# Patient Record
Sex: MALE | Race: WHITE | NOT HISPANIC OR LATINO | Employment: FULL TIME | URBAN - METROPOLITAN AREA
[De-identification: names, ages, dates, MRNs, and addresses within clinical notes are randomized per-mention and may not be internally consistent; named-entity substitution may affect disease eponyms.]

---

## 2018-02-17 ENCOUNTER — HOSPITAL ENCOUNTER (EMERGENCY)
Facility: HOSPITAL | Age: 53
Discharge: HOME | End: 2018-02-17
Attending: EMERGENCY MEDICINE | Admitting: EMERGENCY MEDICINE
Payer: COMMERCIAL

## 2018-02-17 VITALS
TEMPERATURE: 98 F | BODY MASS INDEX: 29.8 KG/M2 | DIASTOLIC BLOOD PRESSURE: 85 MMHG | SYSTOLIC BLOOD PRESSURE: 144 MMHG | OXYGEN SATURATION: 97 % | HEART RATE: 69 BPM | WEIGHT: 220 LBS | RESPIRATION RATE: 18 BRPM | HEIGHT: 72 IN

## 2018-02-17 DIAGNOSIS — S61.011A LACERATION OF RIGHT THUMB: Primary | ICD-10-CM

## 2018-02-17 PROCEDURE — 99282 EMERGENCY DEPT VISIT SF MDM: CPT

## 2018-02-17 RX ADMIN — Medication 1 APPLICATION: at 18:14

## 2018-02-17 NOTE — DISCHARGE INSTRUCTIONS
Finger Laceration   WHAT YOU NEED TO KNOW:   A finger laceration is a deep cut in your skin  It is often caused by a sharp object, such as a knife, or blunt force to your finger  Your blood vessels, bones, joints, tendons, or nerves may also be injured  DISCHARGE INSTRUCTIONS:   Return to the emergency department if:   · Your wound comes apart  · Blood soaks through your bandage  · You have severe pain in your finger or hand  · Your finger is pale and cold  · You have sudden trouble moving your finger  · Your swelling suddenly gets worse  · You have red streaks on your skin coming from your wound  Contact your healthcare provider or hand specialist if:   · You have new numbness or tingling  · Your finger feels warm, looks swollen or red, and is draining pus  · You have a fever  · You have questions or concerns about your condition or care  Medicines: You may  need any of the following:  · Antibiotics  help prevent a bacterial infection  · Acetaminophen  decreases pain and fever  It is available without a doctor's order  Ask how much to take and how often to take it  Follow directions  Read the labels of all other medicines you are using to see if they also contain acetaminophen, or ask your doctor or pharmacist  Acetaminophen can cause liver damage if not taken correctly  Do not use more than 4 grams (4,000 milligrams) total of acetaminophen in one day  · Prescription pain medicine  may be given  Ask your healthcare provider how to take this medicine safely  Some prescription pain medicines contain acetaminophen  Do not take other medicines that contain acetaminophen without talking to your healthcare provider  Too much acetaminophen may cause liver damage  Prescription pain medicine may cause constipation  Ask your healthcare provider how to prevent or treat constipation  · Take your medicine as directed    Contact your healthcare provider if you think your medicine is not helping or if you have side effects  Tell him or her if you are allergic to any medicine  Keep a list of the medicines, vitamins, and herbs you take  Include the amounts, and when and why you take them  Bring the list or the pill bottles to follow-up visits  Carry your medicine list with you in case of an emergency  Self-care:   · Apply ice  on your finger for 15 to 20 minutes every hour or as directed  Use an ice pack, or put crushed ice in a plastic bag  Cover it with a towel before you apply it to your skin  Ice helps prevent tissue damage and decreases swelling and pain  · Elevate  your hand above the level of your heart as often as you can  This will help decrease swelling and pain  Prop your hand on pillows or blankets to keep it elevated comfortably  · Wear your splint as directed  A splint will decrease movement and stress on your wound  The splint may help your wound heal faster  Ask your healthcare provider how to apply and remove a splint  · Apply ointments to decrease scarring  Do not apply ointments until your healthcare provider says it is okay  You may need to wait until your wound is healed  Ask which ointment to buy and how often to use it  Wound care:   · Do not get your wound wet until your healthcare provider says it is okay  Do not soak your hand in water  Do not go swimming until your healthcare provider says it is okay  When your healthcare provider says it is okay, carefully wash around the wound with soap and water  Let soap and water run over your wound  Gently pat the area dry or allow it to air dry  · Change your bandages when they get wet, dirty, or after washing  Apply new, clean bandages as directed  Do not apply elastic bandages or tape too tightly  Do not put powders or lotions on your wound  · Apply antibiotic ointment as directed  Your healthcare provider may give you antibiotic ointment to put over your wound if you have stitches   If you have Strips-Strips over your wound, let them dry up and fall off on their own  If they do not fall off within 14 days, gently remove them  If you have glue over your wound, do not remove or pick at it  If your glue comes off, do not replace it with glue that you have at home  · Check your wound every day for signs of infection  Signs of infection include swelling, redness, or pus  Follow up with your healthcare provider or hand specialist in 2 days:  Write down your questions so you remember to ask them during your visits  © 2017 [employer]  Information is for End User's use only and may not be sold, redistributed or otherwise used for commercial purposes  All illustrations and images included in CareNotes® are the copyrighted property of [employer]  or Rickey Joseph  The above information is an  only  It is not intended as medical advice for individual conditions or treatments  Talk to your doctor, nurse or pharmacist before following any medical regimen to see if it is safe and effective for you

## 2018-03-07 NOTE — ED PROVIDER NOTES
History  Chief Complaint   Patient presents with    Finger Laceration     Pt sliced first digit of right hand (thumb) with mandolin  Pt last had tetanus shot 1 year ago  History provided by:  Patient   used: No    Finger Laceration   Location:  Hand  Hand laceration location:  R fingers  Length:  1 cm  Depth:  Cutaneous  Quality: avulsion    Bleeding: venous    Laceration mechanism:  Unable to specify  Pain details:     Quality:  Aching    Severity:  No pain    Timing:  Constant    Progression:  Unchanged  Foreign body present:  No foreign bodies  Relieved by:  Pressure  Worsened by:  Nothing  Ineffective treatments:  Pressure  Tetanus status:  Up to date  Associated symptoms: no fever, no numbness, no rash, no swelling and no streaking        None       Past Medical History:   Diagnosis Date    GERD (gastroesophageal reflux disease)        History reviewed  No pertinent surgical history  History reviewed  No pertinent family history  I have reviewed and agree with the history as documented  Social History   Substance Use Topics    Smoking status: Never Smoker    Smokeless tobacco: Not on file    Alcohol use No        Review of Systems   Constitutional: Negative for activity change, diaphoresis, fatigue and fever  HENT: Negative for sore throat  Eyes: Negative for visual disturbance  Respiratory: Negative for chest tightness and shortness of breath  Cardiovascular: Negative for chest pain  Gastrointestinal: Negative for abdominal pain, diarrhea, nausea and vomiting  Genitourinary: Negative for dysuria, frequency, hematuria and urgency  Musculoskeletal: Negative for back pain, gait problem, neck pain and neck stiffness  Skin: Positive for wound  Negative for pallor and rash  Allergic/Immunologic: Negative for immunocompromised state  Neurological: Negative for dizziness, speech difficulty, numbness and headaches     Hematological: Does not bruise/bleed easily  Psychiatric/Behavioral: Negative for confusion  Physical Exam  ED Triage Vitals   Temperature Pulse Respirations Blood Pressure SpO2   02/17/18 1729 02/17/18 1730 02/17/18 1730 02/17/18 1730 02/17/18 1730   98 °F (36 7 °C) 69 18 144/85 97 %      Temp Source Heart Rate Source Patient Position - Orthostatic VS BP Location FiO2 (%)   02/17/18 1729 02/17/18 1730 02/17/18 1730 02/17/18 1730 --   Oral Monitor Sitting Right arm       Pain Score       02/17/18 1730       No Pain           Orthostatic Vital Signs  Vitals:    02/17/18 1730   BP: 144/85   Pulse: 69   Patient Position - Orthostatic VS: Sitting       Physical Exam   Constitutional: He is oriented to person, place, and time  He appears well-developed and well-nourished  HENT:   Head: Normocephalic  Eyes: Conjunctivae and EOM are normal  Pupils are equal, round, and reactive to light  No scleral icterus  Neck: Normal range of motion  Neck supple  No JVD present  Cardiovascular: Normal rate and regular rhythm  Pulmonary/Chest: Effort normal and breath sounds normal  No respiratory distress  Abdominal: Soft  Bowel sounds are normal  He exhibits no distension  There is no tenderness  There is no rebound and no guarding  Musculoskeletal: Normal range of motion  He exhibits no tenderness or deformity  Lymphadenopathy:     He has no cervical adenopathy  Neurological: He is alert and oriented to person, place, and time  Coordination normal    Skin: Skin is warm and dry  Capillary refill takes less than 2 seconds  He is not diaphoretic  1x0 5 cm avulsion injury to right thumb pad, mild venous oozing  No nailbed involvement  Psychiatric: He has a normal mood and affect  Vitals reviewed        ED Medications  Medications   LET gel 1 application (1 application Topical Given 2/17/18 1814)       Diagnostic Studies  Results Reviewed     None                 No orders to display              Procedures  Procedures       Phone Contacts  ED Phone Contact    ED Course  ED Course                                MDM  Number of Diagnoses or Management Options  Laceration of right thumb: new and requires workup  Diagnosis management comments: 57-year-old male presented for evaluation of a skin avulsion injury to his right thumb pad  There is no nail bed involvement  Unfortunately, this injury is not amenable to suturing or repair  Patient presented to the emergency department because he was having difficulty stopping the wound from bleeding  Applied let gel in the emergency department and applied some surgeon home  Bleeding stopped  Patient has normal capillary refill and sensation  Will be discharged to follow up with PCP as necessary  Tetanus immunization was up-to-date  Amount and/or Complexity of Data Reviewed  Review and summarize past medical records: yes      CritCare Time    Disposition  Final diagnoses:   Laceration of right thumb     Time reflects when diagnosis was documented in both MDM as applicable and the Disposition within this note     Time User Action Codes Description Comment    2/17/2018  6:58 PM Danya Howard Add [T00 119Y] Laceration of left thumb     2/17/2018  6:59 PM Danya Howard Remove [L78 517V] Laceration of left thumb     2/17/2018  6:59 PM Danya Howard Add [S61 011A] Laceration of right thumb       ED Disposition     ED Disposition Condition Comment    Discharge  Burton Mcclellan discharge to home/self care  Condition at discharge: Good        Follow-up Information     Follow up With Specialties Details Why Contact Info    PCP   as needed         There are no discharge medications for this patient  No discharge procedures on file      ED Provider  Electronically Signed by           Dayne Koenig MD  03/07/18 5390

## 2024-09-26 ENCOUNTER — OFFICE VISIT (OUTPATIENT)
Dept: URGENT CARE | Facility: CLINIC | Age: 59
End: 2024-09-26
Payer: COMMERCIAL

## 2024-09-26 VITALS
RESPIRATION RATE: 14 BRPM | HEART RATE: 63 BPM | OXYGEN SATURATION: 97 % | TEMPERATURE: 97.1 F | DIASTOLIC BLOOD PRESSURE: 86 MMHG | SYSTOLIC BLOOD PRESSURE: 135 MMHG

## 2024-09-26 DIAGNOSIS — R42 DIZZINESS AND GIDDINESS: Primary | ICD-10-CM

## 2024-09-26 PROCEDURE — G0382 LEV 3 HOSP TYPE B ED VISIT: HCPCS | Performed by: PHYSICIAN ASSISTANT

## 2024-09-26 RX ORDER — TADALAFIL 5 MG/1
TABLET ORAL
COMMUNITY
Start: 2024-04-20

## 2024-09-26 RX ORDER — FINASTERIDE 5 MG/1
TABLET, FILM COATED ORAL
COMMUNITY
Start: 2024-04-04

## 2024-09-26 RX ORDER — OMEPRAZOLE 40 MG/1
CAPSULE, DELAYED RELEASE ORAL
COMMUNITY
Start: 2024-04-16

## 2024-09-26 RX ORDER — PLECANATIDE 3 MG/1
TABLET ORAL
COMMUNITY
Start: 2024-04-20

## 2024-09-26 NOTE — PATIENT INSTRUCTIONS
Patient Education    POCT Glucose 106  ECG: Sinus bradycardia 57 bpm.  Normal P QRS intervals and segment.  Normal axis deviation.  Moderate voltage criteria for LVH.  No ST segment elevation or depression.    Vital signs are normal, physical exam was unremarkable.  Blood glucose and ECG present with no red flags.    Patient encourage to follow-up with his PCP in 3-5 days and/or follow-up with one of our [facility] primary care providers here at the [place].  Otherwise,    Referral for primary care provider generated on your behalf  Proceed to  ER if symptoms worsen.    If tests are performed, our office will contact you with results only if changes need to made to the care plan discussed with you at the visit. You can review your full results on [facility] Mychart.         Near Fainting   About this topic   Near fainting is when you feel like you may pass out or lose consciousness, but you do not. For a short time, your brain does not get enough blood flow and oxygen to work the right way. Most of the time near fainting is not serious. Your doctor may want to rule out serious causes of fainting.     What are the causes?   A vasovagal response is most often the cause for near fainting, passing out, or feeling dizzy. It happens as a response to many types of triggers. These may include:  Seeing blood or needles  Pain or seeing someone in pain  Emotions like stress or fear  Illness like fevers, low blood sugar, or fluid loss  Standing for too long in one position or getting up too quickly  Straining to have a bowel movement  Drugs that change your blood pressure  What are the main signs?   You may notice some signs before you felt like you might faint. You may have:  Felt lightheaded  Turned pale  Been shaky  Felt hot or sweaty  Been clammy with a cold sweat  Had an upset stomach or nausea  Noticed a change in your eyesight like dim or blurred eyesight, tunnel vision, dilated pupils, or seeing spots in front of the  eyes  Noticed hearing loss or ringing in the ears  Yawned  How does the doctor diagnose this health problem?   Your doctor will ask you about your health history. Talk to the doctor about:  All the drugs you are taking. Be sure to include all prescription, over the counter, and herbal supplements. Tell the doctor if you have any drug allergy. Bring a list of drugs you take with you.  What you were doing and how you felt before you felt faint  Your doctor will do a physical exam and may order:  Lab tests  Electrocardiogram (ECG)  Heart rate and blood pressure while you are lying down, sitting, and standing  Tilt table test  Based on these results, your doctor may order more heart tests like:  Electrophysiology studies to see if a pacemaker or an implantable cardiac defibrillator (ICD) is needed  Heart tests like echocardiogram, stress test, or Holter monitor  Cardiac cath to look for problems with the heart's blood flow  CT or MRI  How does the doctor treat this health problem?   Sometimes the doctor will only want to rule out a more serious problem. You may not need any other care.  Your doctor may want to adjust some of your drugs, especially if you take drugs for high blood pressure.  If your vasovagal response is due to a low heart rate you may need a pacemaker.  What drugs may be needed?   The doctor may order drugs to:  Relieve dizziness  Control blood pressure  What can be done to prevent this health problem?   Find out your triggers. Triggers are things that you notice just before you feel like you will pass out. These may be seeing blood, getting up too quickly, or high temperatures. Try to avoid them.  Drink 6 to 8 glasses of water each day.  Talk to your doctor about how much salt is in your diet. Ask if you need more or less.  If you feel like you might faint, lie down or ease yourself to the floor. Raise your legs. You may want to sit and put your head down between your legs. Both of these may help to  avoid falling.  Cross your legs and tighten your leg muscles to help keep your blood pressure from dropping. This might also help before you get a shot or have blood drawn. Remain seated 15 to 30 minutes before rising again to ensure you don't feel faint again.  Get up slowly from a sitting or lying down position. Sit on the edge of the bed and take deep breaths before getting out of bed.  Move your legs often if you need to sit or  one position for a long time.  Do foot exercises. Pump your foot up and down from the ankle or make small circles with your foot.  Wear comfortable stockings that stretch to help with blood flow.  When do I need to call the doctor?   Activate the emergency medical system right away if you have signs of a heart attack or stroke. Call 911 in the [place] or [place]. The sooner treatment begins, the better your chances for recovery. Call for emergency help right away if you have:  Signs of heart attack:  Chest pain  Trouble breathing  Fast heartbeat  Feeling dizzy  Signs of stroke:  Sudden numbness or weakness of the face, arm, or leg, especially on one side of the body  Sudden confusion, trouble speaking or understanding  Sudden trouble seeing in one or both eyes  Sudden trouble walking, dizziness, loss of balance or coordination  Sudden severe headache with no known cause  Call your doctor if you have:  Headache not helped by pain drugs  You hit your head after you faint  You have another near fainting event  Last Reviewed Date   2020-03-27  Consumer Information Use and Disclaimer   This generalized information is a limited summary of diagnosis, treatment, and/or medication information. It is not meant to be comprehensive and should be used as a tool to help the user understand and/or assess potential diagnostic and treatment options. It does NOT include all information about conditions, treatments, medications, side effects, or risks that may apply to a specific patient. It  is not intended to be medical advice or a substitute for the medical advice, diagnosis, or treatment of a health care provider based on the health care provider's examination and assessment of a patient’s specific and unique circumstances. Patients must speak with a health care provider for complete information about their health, medical questions, and treatment options, including any risks or benefits regarding use of medications. This information does not endorse any treatments or medications as safe, effective, or approved for treating a specific patient. [employer] and its affiliates disclaim any warranty or liability relating to this information or the use thereof. The use of this information is governed by the Terms of Use, available at https://www.[employer].com/en/know/clinical-effectiveness-terms   Copyright   Copyright © 2024 [employer] and its affiliates and/or licensors. All rights reserved.

## 2024-09-26 NOTE — PROGRESS NOTES
[facility]        NAME: Sam Martin is a 59 y.o. male  : 1965    MRN: [id number]  DATE: 2024  TIME: 11:01 AM    Assessment and Plan   Dizziness and giddiness [R42]  1. Dizziness and giddiness  Fingerstick Glucose (POCT)    ECG 12 lead    Ambulatory Referral to Family Practice            Patient Instructions       POCT Glucose 106  ECG: Sinus bradycardia 57 bpm.  Normal P QRS intervals and segment.  Normal axis deviation.  Moderate voltage criteria for LVH.  No ST segment elevation or depression.    Vital signs are normal, physical exam was unremarkable.  Blood glucose and ECG present with no red flags.    Patient encourage to follow-up with his PCP in 3-5 days and/or follow-up with one of our [facility] primary care providers here at the [place].  Otherwise,    Referral for primary care provider generated on your behalf  Proceed to  ER if symptoms worsen.    If tests are performed, our office will contact you with results only if changes need to made to the care plan discussed with you at the visit. [facility]    Chief Complaint     Chief Complaint   Patient presents with    Dizziness     Past couple days feeling dizzy, foggy...losing concentration at work. Also fatigued.         History of Present Illness       59-year-old male with no significant past medical history is complaining today of dizziness which he describes as syncope.  It lasted for approximately 1 hour earlier this morning.  He has had several episodes in the past and has seen his PCP however no findings, as per patient.  Symptoms are associated with blurry vision, fatigue, and poor concentration previous to this morning episode.  There has been no recent travels, no complaints of URI such as fever, chills, cough, dyspnea, tachypnea, hemoptysis, wheezing shortness of breath, sputum.  He denies numbness, paresthesia, chest pain, diaphoresis, jaw or left arm pain, nausea  vomiting, abdominal pain, back pain, or  symptoms.  Patient's PCP is in [facility].        Review of Systems   Review of Systems   Constitutional:  Positive for fatigue. Negative for activity change, appetite change, chills and diaphoresis.   HENT:  Negative for congestion and sore throat.    Respiratory:  Negative for cough, chest tightness, shortness of breath and wheezing.    Cardiovascular:  Negative for chest pain, palpitations and leg swelling.   Gastrointestinal:  Negative for abdominal pain, nausea and vomiting.   Skin:  Negative for color change and rash.   Neurological:  Positive for dizziness. Negative for weakness, light-headedness and headaches.         Current Medications       Current Outpatient Medications:     finasteride (PROSCAR) 5 mg tablet, , Disp: , Rfl:     omeprazole (PriLOSEC) 40 MG capsule, , Disp: , Rfl:     tadalafil (CIALIS) 5 MG tablet, , Disp: , Rfl:     Trulance 3 MG TABS, , Disp: , Rfl:     Current Allergies     Allergies as of 09/26/2024    (No Known Allergies)            The following portions of the patient's history were reviewed and updated as appropriate: allergies, current medications, past family history, past medical history, past social history, past surgical history and problem list.     Past Medical History:   Diagnosis Date    GERD (gastroesophageal reflux disease)        History reviewed. No pertinent surgical history.    History reviewed. No pertinent family history.      Medications have been verified.        Objective   /86   Pulse 63   Temp (!) 97.1 °F (36.2 °C)   Resp 14   SpO2 97%        Physical Exam     Physical Exam  Vitals and nursing note reviewed.   Constitutional:       General: He is not in acute distress.     Appearance: Normal appearance. He is normal weight. He is not ill-appearing, toxic-appearing or diaphoretic.   HENT:      Head: Normocephalic and atraumatic.      Right Ear: Tympanic membrane, ear canal and external ear normal.       Left Ear: Tympanic membrane, ear canal and external ear normal.      Nose: Nose normal.      Mouth/Throat:      Mouth: Mucous membranes are moist.      Pharynx: Oropharynx is clear.   Eyes:      General: No scleral icterus.     Extraocular Movements: Extraocular movements intact.      Conjunctiva/sclera: Conjunctivae normal.      Pupils: Pupils are equal, round, and reactive to light.   Neck:      Vascular: No carotid bruit.   Cardiovascular:      Rate and Rhythm: Normal rate and regular rhythm.      Pulses: Normal pulses.      Heart sounds: Normal heart sounds. No murmur heard.  Pulmonary:      Effort: Pulmonary effort is normal. No respiratory distress.      Breath sounds: Normal breath sounds. No stridor. No wheezing, rhonchi or rales.   Abdominal:      General: Abdomen is flat. Bowel sounds are normal.      Palpations: Abdomen is soft. There is no mass.      Tenderness: There is no abdominal tenderness. There is no guarding or rebound.   Musculoskeletal:         General: Normal range of motion.      Cervical back: Normal range of motion and neck supple. No rigidity or tenderness.      Right lower leg: No edema.      Left lower leg: No edema.   Lymphadenopathy:      Cervical: No cervical adenopathy.   Skin:     General: Skin is warm and dry.      Findings: No bruising, erythema, lesion or rash.   Neurological:      General: No focal deficit present.      Mental Status: He is alert and oriented to person, place, and time.      GCS: GCS eye subscore is 4. GCS verbal subscore is 5. GCS motor subscore is 6.      Cranial Nerves: Cranial nerves 2-12 are intact. No cranial nerve deficit.      Motor: Motor function is intact.      Coordination: Coordination is intact. Romberg sign negative. Coordination normal.      Gait: Gait normal.   Psychiatric:         Mood and Affect: Mood normal.         Behavior: Behavior normal.         Thought Content: Thought content normal.         Judgment: Judgment normal.